# Patient Record
Sex: MALE | Race: WHITE | NOT HISPANIC OR LATINO | Employment: OTHER | URBAN - METROPOLITAN AREA
[De-identification: names, ages, dates, MRNs, and addresses within clinical notes are randomized per-mention and may not be internally consistent; named-entity substitution may affect disease eponyms.]

---

## 2024-08-04 SDOH — ECONOMIC STABILITY: INCOME INSECURITY: IN THE LAST 12 MONTHS, WAS THERE A TIME WHEN YOU WERE NOT ABLE TO PAY THE MORTGAGE OR RENT ON TIME?: NO

## 2024-08-04 SDOH — HEALTH STABILITY: PHYSICAL HEALTH: ON AVERAGE, HOW MANY DAYS PER WEEK DO YOU ENGAGE IN MODERATE TO STRENUOUS EXERCISE (LIKE A BRISK WALK)?: 1 DAY

## 2024-08-04 SDOH — HEALTH STABILITY: PHYSICAL HEALTH: ON AVERAGE, HOW MANY MINUTES DO YOU ENGAGE IN EXERCISE AT THIS LEVEL?: 0 MIN

## 2024-08-04 SDOH — ECONOMIC STABILITY: INCOME INSECURITY: HOW HARD IS IT FOR YOU TO PAY FOR THE VERY BASICS LIKE FOOD, HOUSING, MEDICAL CARE, AND HEATING?: SOMEWHAT HARD

## 2024-08-04 SDOH — ECONOMIC STABILITY: FOOD INSECURITY: WITHIN THE PAST 12 MONTHS, THE FOOD YOU BOUGHT JUST DIDN'T LAST AND YOU DIDN'T HAVE MONEY TO GET MORE.: NEVER TRUE

## 2024-08-04 SDOH — ECONOMIC STABILITY: TRANSPORTATION INSECURITY
IN THE PAST 12 MONTHS, HAS LACK OF RELIABLE TRANSPORTATION KEPT YOU FROM MEDICAL APPOINTMENTS, MEETINGS, WORK OR FROM GETTING THINGS NEEDED FOR DAILY LIVING?: NO

## 2024-08-04 SDOH — ECONOMIC STABILITY: FOOD INSECURITY: WITHIN THE PAST 12 MONTHS, YOU WORRIED THAT YOUR FOOD WOULD RUN OUT BEFORE YOU GOT MONEY TO BUY MORE.: NEVER TRUE

## 2024-08-04 SDOH — ECONOMIC STABILITY: HOUSING INSECURITY
IN THE LAST 12 MONTHS, WAS THERE A TIME WHEN YOU DID NOT HAVE A STEADY PLACE TO SLEEP OR SLEPT IN A SHELTER (INCLUDING NOW)?: NO

## 2024-08-04 SDOH — HEALTH STABILITY: MENTAL HEALTH
STRESS IS WHEN SOMEONE FEELS TENSE, NERVOUS, ANXIOUS, OR CAN'T SLEEP AT NIGHT BECAUSE THEIR MIND IS TROUBLED. HOW STRESSED ARE YOU?: RATHER MUCH

## 2024-08-04 SDOH — ECONOMIC STABILITY: TRANSPORTATION INSECURITY
IN THE PAST 12 MONTHS, HAS THE LACK OF TRANSPORTATION KEPT YOU FROM MEDICAL APPOINTMENTS OR FROM GETTING MEDICATIONS?: NO

## 2024-08-04 SDOH — ECONOMIC STABILITY: TRANSPORTATION INSECURITY
IN THE PAST 12 MONTHS, HAS LACK OF TRANSPORTATION KEPT YOU FROM MEETINGS, WORK, OR FROM GETTING THINGS NEEDED FOR DAILY LIVING?: NO

## 2024-08-04 SDOH — ECONOMIC STABILITY: HOUSING INSECURITY: IN THE LAST 12 MONTHS, HOW MANY PLACES HAVE YOU LIVED?: 2

## 2024-08-04 ASSESSMENT — LIFESTYLE VARIABLES
SKIP TO QUESTIONS 9-10: 1
HOW OFTEN DO YOU HAVE A DRINK CONTAINING ALCOHOL: NEVER
HOW MANY STANDARD DRINKS CONTAINING ALCOHOL DO YOU HAVE ON A TYPICAL DAY: PATIENT DOES NOT DRINK
AUDIT-C TOTAL SCORE: 0
HOW OFTEN DO YOU HAVE SIX OR MORE DRINKS ON ONE OCCASION: NEVER

## 2024-08-04 ASSESSMENT — SOCIAL DETERMINANTS OF HEALTH (SDOH)
IN A TYPICAL WEEK, HOW MANY TIMES DO YOU TALK ON THE PHONE WITH FAMILY, FRIENDS, OR NEIGHBORS?: NEVER
HOW OFTEN DO YOU ATTEND CHURCH OR RELIGIOUS SERVICES?: NEVER
HOW OFTEN DO YOU ATTENT MEETINGS OF THE CLUB OR ORGANIZATION YOU BELONG TO?: NEVER
HOW OFTEN DO YOU ATTENT MEETINGS OF THE CLUB OR ORGANIZATION YOU BELONG TO?: NEVER
HOW OFTEN DO YOU HAVE SIX OR MORE DRINKS ON ONE OCCASION: NEVER
IN THE PAST 12 MONTHS, HAS THE ELECTRIC, GAS, OIL, OR WATER COMPANY THREATENED TO SHUT OFF SERVICE IN YOUR HOME?: NO
ARE YOU MARRIED, WIDOWED, DIVORCED, SEPARATED, NEVER MARRIED, OR LIVING WITH A PARTNER?: LIVING WITH PARTNER
HOW OFTEN DO YOU GET TOGETHER WITH FRIENDS OR RELATIVES?: NEVER
HOW OFTEN DO YOU ATTEND CHURCH OR RELIGIOUS SERVICES?: NEVER
DO YOU BELONG TO ANY CLUBS OR ORGANIZATIONS SUCH AS CHURCH GROUPS UNIONS, FRATERNAL OR ATHLETIC GROUPS, OR SCHOOL GROUPS?: NO
DO YOU BELONG TO ANY CLUBS OR ORGANIZATIONS SUCH AS CHURCH GROUPS UNIONS, FRATERNAL OR ATHLETIC GROUPS, OR SCHOOL GROUPS?: NO
HOW OFTEN DO YOU GET TOGETHER WITH FRIENDS OR RELATIVES?: NEVER
ARE YOU MARRIED, WIDOWED, DIVORCED, SEPARATED, NEVER MARRIED, OR LIVING WITH A PARTNER?: LIVING WITH PARTNER
HOW OFTEN DO YOU HAVE A DRINK CONTAINING ALCOHOL: NEVER
HOW HARD IS IT FOR YOU TO PAY FOR THE VERY BASICS LIKE FOOD, HOUSING, MEDICAL CARE, AND HEATING?: SOMEWHAT HARD
WITHIN THE PAST 12 MONTHS, YOU WORRIED THAT YOUR FOOD WOULD RUN OUT BEFORE YOU GOT THE MONEY TO BUY MORE: NEVER TRUE
HOW MANY DRINKS CONTAINING ALCOHOL DO YOU HAVE ON A TYPICAL DAY WHEN YOU ARE DRINKING: PATIENT DOES NOT DRINK
IN A TYPICAL WEEK, HOW MANY TIMES DO YOU TALK ON THE PHONE WITH FAMILY, FRIENDS, OR NEIGHBORS?: NEVER

## 2024-08-07 ENCOUNTER — OFFICE VISIT (OUTPATIENT)
Dept: MEDICAL GROUP | Facility: MEDICAL CENTER | Age: 69
End: 2024-08-07
Payer: MEDICARE

## 2024-08-07 VITALS
DIASTOLIC BLOOD PRESSURE: 100 MMHG | HEIGHT: 73 IN | BODY MASS INDEX: 25.84 KG/M2 | TEMPERATURE: 97.5 F | OXYGEN SATURATION: 97 % | HEART RATE: 73 BPM | SYSTOLIC BLOOD PRESSURE: 164 MMHG | WEIGHT: 195 LBS

## 2024-08-07 DIAGNOSIS — R97.20 ELEVATED PSA: ICD-10-CM

## 2024-08-07 DIAGNOSIS — I10 ESSENTIAL HYPERTENSION: ICD-10-CM

## 2024-08-07 DIAGNOSIS — F41.9 ANXIETY: ICD-10-CM

## 2024-08-07 DIAGNOSIS — Z11.59 NEED FOR HEPATITIS C SCREENING TEST: ICD-10-CM

## 2024-08-07 DIAGNOSIS — E55.9 VITAMIN D DEFICIENCY: ICD-10-CM

## 2024-08-07 DIAGNOSIS — M54.2 NECK PAIN: ICD-10-CM

## 2024-08-07 DIAGNOSIS — R42 LIGHT HEADEDNESS: ICD-10-CM

## 2024-08-07 DIAGNOSIS — F33.41 RECURRENT MAJOR DEPRESSIVE DISORDER, IN PARTIAL REMISSION (HCC): ICD-10-CM

## 2024-08-07 DIAGNOSIS — E78.1 HYPERGLYCERIDEMIA: ICD-10-CM

## 2024-08-07 PROBLEM — L20.89 OTHER ATOPIC DERMATITIS: Status: ACTIVE | Noted: 2019-05-21

## 2024-08-07 PROBLEM — F32.A DEPRESSION: Status: ACTIVE | Noted: 2024-08-07

## 2024-08-07 PROCEDURE — 99204 OFFICE O/P NEW MOD 45 MIN: CPT | Performed by: FAMILY MEDICINE

## 2024-08-07 PROCEDURE — 3080F DIAST BP >= 90 MM HG: CPT | Performed by: FAMILY MEDICINE

## 2024-08-07 PROCEDURE — 3077F SYST BP >= 140 MM HG: CPT | Performed by: FAMILY MEDICINE

## 2024-08-07 RX ORDER — LOSARTAN POTASSIUM 25 MG/1
25 TABLET ORAL DAILY
COMMUNITY
Start: 2024-06-13 | End: 2024-08-07

## 2024-08-07 RX ORDER — TIZANIDINE 2 MG/1
2 TABLET ORAL
COMMUNITY
Start: 2024-05-07

## 2024-08-07 RX ORDER — LISINOPRIL 10 MG/1
10 TABLET ORAL
Qty: 30 TABLET | Refills: 1 | Status: SHIPPED | OUTPATIENT
Start: 2024-08-07

## 2024-08-07 RX ORDER — PSYLLIUM HUSK 0.4 G
1000 CAPSULE ORAL
COMMUNITY

## 2024-08-07 ASSESSMENT — PATIENT HEALTH QUESTIONNAIRE - PHQ9: CLINICAL INTERPRETATION OF PHQ2 SCORE: 0

## 2024-08-07 NOTE — PROGRESS NOTES
Verbal consent was acquired by the patient to use [employer] ambient listening note generation during this visit    Subjective:     CC:  Diagnoses of Essential hypertension, Light headedness, Neck pain, Elevated PSA, Recurrent major depressive disorder, in partial remission (HCC), Anxiety, Vitamin D deficiency, Hyperglyceridemia, and Need for hepatitis C screening test were pertinent to this visit.    HISTORY OF THE PRESENT ILLNESS: Patient is a 68 y.o. male. This pleasant patient is here today to establish care and discuss hypertension.     History of Present Illness  The patient is a 68-year-old male who is coming in to establish care today.    Over the past few years, he has observed an increase in his blood pressure, typically around 140/80. Despite this, he maintains an active lifestyle. Previous blood work, conducted 4 or 5 times in 2023, showed no abnormalities. He believes his hypertension is psychological and emotional in nature. He has tried several blood pressure medications over the past year, with the first two causing rash and uncontrollable coughing fits. He experienced lethargy, lack of stamina, and shortness of breath while on losartan. He also experienced an unusual sensation in his head and vision, which he did not associate with his medication. After discontinuing the medication, he felt better, with improved energy levels and no shortness of breath. However, he occasionally experiences dizziness, difficulty turning his head, and the room spinning. He denies any loss of consciousness or increased dizziness when standing up quickly. Turning his head seems to trigger these symptoms. He has always been tense since childhood, primarily in his upper back and lower back. He injured his back when he was 23 or 24 years old. Despite multiple scans, no abnormalities were found. He was placed on a heart monitor for a week. He maintains a healthy diet and takes care of himself. His physical activity has  decreased in the past year. He suspects mental depression since his teenage years, but his parents reassured him he was fine. He was diagnosed with clinical depression in early 2000 by a therapist. He has had suicidal thoughts in the past, but not recently. He describes his suicidal thoughts as a 44 Magnum pointed in his head. He still feels occasional depression, but not suicidal. He went to a therapist 15 years ago for about 6 months. He was prescribed citalopram and risperidone for depression but now does not wish to be on these medicines.    He suspects his blood pressure may be due to tension in his upper back. He takes muscle relaxers occasionally.    He urinates frequently and wakes up once or twice at night to urinate. A year ago, a urologist informed him that his prostate was the size of a small orange.    He wishes to know his blood type.    Supplemental Information  He denies any surgery. He has had a vasectomy.    SOCIAL HISTORY  He has never smoked. He drinks alcohol occasionally. He tried marijuana products to calm himself, but it did not seem to do anything.    FAMILY HISTORY  His father had pancreatic cancer at the age of 67. His father had diabetes, heart disease, hypertension, hyperlipidemia, and high cholesterol. His mother had arthritis since she was 40. His brothers are healthy. He denies any other family members with medical history.    Current Outpatient Medications Ordered in Epic   Medication Sig Dispense Refill    tizanidine (ZANAFLEX) 2 MG tablet Take 2 mg by mouth.      vitamin D (VITAMIN D-1000 MAX ST) 1000 UNIT Tab Take 1,000 Units by mouth.      lisinopril (PRINIVIL) 10 MG Tab Take 1 Tablet by mouth at bedtime. 30 Tablet 1     No current Epic-ordered facility-administered medications on file.       Allergies   Allergen Reactions    Codeine Nausea and Anxiety     delerius    Losartan Shortness of Breath    Amlodipine Cough and Rash       Past Medical History:   Diagnosis Date    Anxiety      Depression     Elevated blood pressure reading without diagnosis of hypertension 07/31/2013    Hypertension     Low back pain 07/31/2013    Neck pain 07/31/2013       Past Surgical History:   Procedure Laterality Date    VASECTOMY         Family History   Problem Relation Age of Onset    Arthritis Mother     Cancer Father 67        Pancreatic, He had the most horrible eating habits I've ever seen.    Diabetes Father     Heart Disease Father     Hypertension Father     Hyperlipidemia Father     Stroke Neg Hx     Lung Disease Neg Hx        Social History     Socioeconomic History    Marital status: Legally     Highest education level: 12th grade   Tobacco Use    Smoking status: Never    Smokeless tobacco: Never    Tobacco comments:     Never used tobacco products of any kind.   Vaping Use    Vaping status: Never Used   Substance and Sexual Activity    Alcohol use: No    Drug use: No    Sexual activity: Yes     Partners: Female     Birth control/protection: Male Sterilization     Social Determinants of Health     Financial Resource Strain: Medium Risk (8/4/2024)    Overall Financial Resource Strain (CARDIA)     Difficulty of Paying Living Expenses: Somewhat hard   Food Insecurity: No Food Insecurity (8/4/2024)    Hunger Vital Sign     Worried About Running Out of Food in the Last Year: Never true     Ran Out of Food in the Last Year: Never true   Transportation Needs: No Transportation Needs (8/4/2024)    PRAPARE - Transportation     Lack of Transportation (Medical): No     Lack of Transportation (Non-Medical): No   Physical Activity: Inactive (8/4/2024)    Exercise Vital Sign     Days of Exercise per Week: 1 day     Minutes of Exercise per Session: 0 min   Stress: Stress Concern Present (8/4/2024)    [employer] - Occupational Stress Questionnaire     Feeling of Stress : Rather much   Social Connections: Socially Isolated (8/4/2024)    Social Connection and Isolation Panel  "[NHANES]     Frequency of Communication with Friends and Family: Never     Frequency of Social Gatherings with Friends and Family: Never     Attends Gnosticism Services: Never     Active Member of Clubs or Organizations: No     Attends Club or Organization Meetings: Never     Marital Status: Living with partner   Housing Stability: Low Risk  (8/4/2024)    Housing Stability Vital Sign     Unable to Pay for Housing in the Last Year: No     Number of Places Lived in the Last Year: 2     Unstable Housing in the Last Year: No       Health Maintenance: Patient declines most screenings and vaccines    ROS:   ROS see HPI      Objective:     Exam: BP (!) 164/100   Pulse 73   Temp 36.4 °C (97.5 °F) (Temporal)   Ht 1.854 m (6' 1\")   Wt 88.5 kg (195 lb)   SpO2 97%  Body mass index is 25.73 kg/m².    Physical Exam  Vitals reviewed.   Constitutional:       General: He is not in acute distress.     Appearance: Normal appearance.   HENT:      Head: Normocephalic and atraumatic.   Cardiovascular:      Rate and Rhythm: Normal rate and regular rhythm.      Heart sounds: Normal heart sounds.   Pulmonary:      Effort: Pulmonary effort is normal. No respiratory distress.      Breath sounds: Normal breath sounds.   Skin:     General: Skin is warm and dry.   Neurological:      Mental Status: He is alert. Mental status is at baseline.   Psychiatric:         Mood and Affect: Mood normal.         Behavior: Behavior normal.         Results  Laboratory Studies  Mildly elevated PSA at 5.9.       Assessment & Plan:   68 y.o. male with the following -    1. Essential hypertension  - CBC WITH DIFFERENTIAL; Future  - Comp Metabolic Panel; Future  - Lipid Profile; Future  - TSH WITH REFLEX TO FT4; Future  - MICROALBUMIN CREAT RATIO URINE; Future  - lisinopril (PRINIVIL) 10 MG Tab; Take 1 Tablet by mouth at bedtime.  Dispense: 30 Tablet; Refill: 1    2. Light headedness  - CBC WITH DIFFERENTIAL; Future  - Comp Metabolic Panel; Future  - Lipid " "Profile; Future    3. Neck pain  - tizanidine (ZANAFLEX) 2 MG tablet; Take 2 mg by mouth.  - CBC WITH DIFFERENTIAL; Future  - Comp Metabolic Panel; Future    4. Elevated PSA  - PROSTATE SPECIFIC AG DIAGNOSTIC; Future  - Referral to Urology    5. Recurrent major depressive disorder, in partial remission (HCC)  - Referral to Psychology    6. Anxiety  - Referral to Psychology    7. Vitamin D deficiency  - vitamin D (VITAMIN D-1000 MAX ST) 1000 UNIT Tab; Take 1,000 Units by mouth.  - VITAMIN D,25 HYDROXY (DEFICIENCY); Future    8. Hyperglyceridemia  - Comp Metabolic Panel; Future  - Lipid Profile; Future    9. Need for hepatitis C screening test  - HEP C VIRUS ANTIBODY; Future      Assessment & Plan  1. Hypertension.  His blood pressure reading today is 164/100, which is above the optimal goal of 130/80. The lightheadedness and dizziness he experiences when turning his head suggests vertigo. His lab work, conducted on 02/18/2024 in the ER, was normal, except for a mildly elevated PSA. Non-medical methods for treating vertigo were discussed. Fasting lab work will be repeated and a referral will be made. He was advised to consider mindfulness practice, meditation, yoga, and stretching exercises. A handout detailing mindfulness and the DASH eating plan was provided. Lisinopril 10 mg at bedtime was prescribed.     2. Depression and anxiety.  A referral was made for counseling. He was advised to consider mindfulness practice, meditation, yoga, and stretching exercises.  Patient denies SI or HI.    Patient has low trust in \"[employer]\".  He described medicines as poisoned multiple times and is resistant to treatment but does understand the importance of blood pressure treatment as well.      Return in about 6 weeks (around 9/18/2024), or if symptoms worsen or fail to improve, for Lifestyle, Lab F/U, HTN F/U.    Please note that this dictation was created using voice recognition software. I have made every reasonable attempt " to correct obvious errors, but I expect that there are errors of grammar and possibly content that I did not discover before finalizing the note.